# Patient Record
(demographics unavailable — no encounter records)

---

## 2025-03-12 NOTE — HISTORY OF PRESENT ILLNESS
[FreeTextEntry1] : Trever is a 13 year old male who presents today accompanied by parent for evaluation of the back with concern for scoliosis. An asymmetry was recently noted on routine exam by pediatrician. There is no family history known for scoliosis. Patient denies any back pain, radiating pain, LE numbness or weakness. No bowel or bladder dysfunction. Patient is able to play without any limitations or complaints. Here for further evaluation and management.

## 2025-03-12 NOTE — ASSESSMENT
[FreeTextEntry1] : Trever is a 13.5 year old male with spinal asymmetry  The history was obtained today from the child and parent; given the patient's age and/or the child's mental capacity, the history was unreliable and the parent was used as an independent historian.   Radiographs taken today in office were independently reviewed. There is a mild lumbar asymmetry of 7.8 degrees. I explained his curve is very mild. Given the fact that patient is 13 years of age, and Risser IV, patient has minimal spinal growth remaining. We will continue to watch this to ensure there is no progression of the curve during growth. I explained that if the curve were to increase to 25 degrees during growing years, we would need to start a brace to help prevent further progression. Surgery is usually recommended for curves 40-45 degrees or more. I am recommending follow up in 6 months. Scoliosis PA and lateral EOS x-rays will be done at that time. This plan was discussed with family and all questions and concerns were addressed today.  I, Bernadette Kohler PA-C, have acted as a scribe and documented the above for Dr. Reyes  The above documentation completed by the scribe is an accurate record of both my words and actions.

## 2025-03-12 NOTE — DATA REVIEWED
[de-identified] : My interpretation and review of images taken today, 03/11/2025, in office:  AP/Lat scoliosis obtained and reviewed today with family. There is a 7.8 degree curvature lumbar region noted on AP films. Risser IV, Garcia 6-7. Normal kyphosis and lordosis on lateral. No spondylolysis or spondylolisthesis noted.

## 2025-03-12 NOTE — DATA REVIEWED
[de-identified] : My interpretation and review of images taken today, 03/11/2025, in office:  AP/Lat scoliosis obtained and reviewed today with family. There is a 7.8 degree curvature lumbar region noted on AP films. Risser IV, Garcia 6-7. Normal kyphosis and lordosis on lateral. No spondylolysis or spondylolisthesis noted.

## 2025-03-12 NOTE — REVIEW OF SYSTEMS
[ADHD] : ADHD [NI] : Endocrine [Nl] : Hematologic/Lymphatic [Change in Activity] : no change in activity [Fever Above 102] : no fever [Malaise] : no malaise [Rash] : no rash [Murmur] : no murmur [Cough] : no cough

## 2025-03-12 NOTE — HISTORY OF PRESENT ILLNESS
[FreeTextEntry1] : Trever is a 13 year old male who presents today accompanied by parent for evaluation of the back with concern for scoliosis. An asymmetry was recently noted on routine exam by pediatrician. There is no family history known for scoliosis. Patient denies any back pain, radiating pain, LE numbness or weakness. No bowel or bladder dysfunction. Patient is able to play without any limitations or complaints. Here for further evaluation and management.  Improved

## 2025-03-12 NOTE — PHYSICAL EXAM
[FreeTextEntry1] : Healthy appearing 13 year-old child. Awake, alert, in no acute distress. Pleasant and cooperative.  Eyes are clear with no sclera abnormalities. External ears, nose and mouth are clear.  Good respiratory effort with no audible wheezing without use of a stethoscope. Ambulates independently with no evidence of antalgia. Good coordination and balance. Able to get on and off exam table without difficulty.   Spine: Inspection of the skin reveals no cafe au lait spots or large birth marks. From behind, patient is well centered with head and shoulders appropriately aligned with pelvis.  Shoulders are even with no significant scapula or flank asymmetry. Spine is grossly midline and straight. On Umberto's Forward Bend, there is very subtle left lumbar rotation noted NTTP over spinous processes and paraspinal musculature. Full range of motion at cervical, thoracic and lumbar spine with no pain or difficulty. No pelvic obliquity.   LE exam: Skin clean and intact. No deformity or lymphedema. Full ROM bilateral hips, knees and ankles.  5/5 motor strength in LE. SILT distally. Brisk symmetric reflexes at Patellar and Achilles' tendons + bilateral hamstring tightness noted No clonus. DP 2+, BCR < 2 seconds